# Patient Record
Sex: FEMALE | Race: OTHER | HISPANIC OR LATINO | URBAN - METROPOLITAN AREA
[De-identification: names, ages, dates, MRNs, and addresses within clinical notes are randomized per-mention and may not be internally consistent; named-entity substitution may affect disease eponyms.]

---

## 2019-06-05 ENCOUNTER — EMERGENCY (EMERGENCY)
Facility: HOSPITAL | Age: 54
LOS: 1 days | Discharge: HOME | End: 2019-06-05
Attending: EMERGENCY MEDICINE
Payer: MEDICAID

## 2019-06-05 VITALS
HEART RATE: 77 BPM | DIASTOLIC BLOOD PRESSURE: 77 MMHG | HEIGHT: 63 IN | RESPIRATION RATE: 20 BRPM | TEMPERATURE: 98 F | SYSTOLIC BLOOD PRESSURE: 126 MMHG | OXYGEN SATURATION: 100 % | WEIGHT: 190.04 LBS

## 2019-06-05 PROCEDURE — 71046 X-RAY EXAM CHEST 2 VIEWS: CPT

## 2019-06-05 PROCEDURE — 99284 EMERGENCY DEPT VISIT MOD MDM: CPT | Mod: 25

## 2019-06-05 PROCEDURE — 99283 EMERGENCY DEPT VISIT LOW MDM: CPT

## 2019-06-05 PROCEDURE — 71046 X-RAY EXAM CHEST 2 VIEWS: CPT | Mod: 26

## 2019-06-05 PROCEDURE — 93005 ELECTROCARDIOGRAM TRACING: CPT

## 2019-06-05 PROCEDURE — 96372 THER/PROPH/DIAG INJ SC/IM: CPT

## 2019-06-05 PROCEDURE — 93010 ELECTROCARDIOGRAM REPORT: CPT

## 2019-06-05 RX ORDER — FAMOTIDINE 10 MG/ML
20 INJECTION INTRAVENOUS ONCE
Refills: 0 | Status: DISCONTINUED | OUTPATIENT
Start: 2019-06-05 | End: 2019-06-05

## 2019-06-05 RX ORDER — IBUPROFEN 200 MG
1 TABLET ORAL
Qty: 9 | Refills: 0
Start: 2019-06-05 | End: 2019-06-07

## 2019-06-05 RX ORDER — VALACYCLOVIR 500 MG/1
1000 TABLET, FILM COATED ORAL ONCE
Refills: 0 | Status: COMPLETED | OUTPATIENT
Start: 2019-06-05 | End: 2019-06-05

## 2019-06-05 RX ORDER — OXYCODONE HYDROCHLORIDE 5 MG/1
1 TABLET ORAL
Qty: 9 | Refills: 0
Start: 2019-06-05 | End: 2019-06-07

## 2019-06-05 RX ORDER — VALACYCLOVIR 500 MG/1
1 TABLET, FILM COATED ORAL
Qty: 14 | Refills: 0
Start: 2019-06-05 | End: 2019-06-11

## 2019-06-05 RX ORDER — KETOROLAC TROMETHAMINE 30 MG/ML
30 SYRINGE (ML) INJECTION ONCE
Refills: 0 | Status: DISCONTINUED | OUTPATIENT
Start: 2019-06-05 | End: 2019-06-05

## 2019-06-05 RX ADMIN — Medication 30 MILLIGRAM(S): at 23:04

## 2019-06-05 RX ADMIN — VALACYCLOVIR 1000 MILLIGRAM(S): 500 TABLET, FILM COATED ORAL at 23:05

## 2019-06-05 NOTE — ED ADULT NURSE NOTE - OBJECTIVE STATEMENT
Pt is here with c/o upper, left  back pain that radiates to her L chest and L breast.  Pt noted to have a rash to her upper back, Dr Beal aware and order for IV insertion and labs canceled.  Pt denies difficulty breathing.

## 2019-06-05 NOTE — ED ADULT NURSE NOTE - NSIMPLEMENTINTERV_GEN_ALL_ED
Implemented All Universal Safety Interventions:  [place] to call system. Call bell, personal items and telephone within reach. Instruct patient to call for assistance. Room bathroom lighting operational. Non-slip footwear when patient is off stretcher. Physically safe environment: no spills, clutter or unnecessary equipment. Stretcher in lowest position, wheels locked, appropriate side rails in place.

## 2019-06-05 NOTE — ED STATDOCS - PROGRESS NOTE DETAILS
52 y/o F pt with hx of heart murmur, DM, anxiety and depression presents to the ED c/o constant L sided pectoral chest pain with assoc. L arm pain and back pain for 3 days. Pt reports she vomited today. Rates her pain a 9/10, states that her pain feels like "itching". Reports her grandfather has hx of cardiac etiology, but not her parents. Pt is currently on DM medication, depression meds, and anxiety meds. Denies taking ASA today. Denies abdominal pain. No further complaints at this   Cardio: None  PE: Reproducible L parascapular tenderness

## 2019-06-05 NOTE — ED PROVIDER NOTE - PROGRESS NOTE DETAILS
educated pt on shingles, advised her to quarantine and not drive. advised pt to stay away from children under 2 y/o, and adults older than 61 y/o.

## 2019-06-05 NOTE — ED PROVIDER NOTE - NS ED ROS FT
no weight change, no fever or chills  no recent travel, no recent abox, no sick contacts  no recent change in medications  no rash, no bruises, (+) itchiness  (+) ear pain  no visual changes no eye discharge  (+) cough, no cold or congestion,   no sob, (+) chest pain  no orthopnea, no pnd  no abd pain, no n/v/d  no hematuria, no change in urinary habits  no joint pain, no deformity  no headache, no paresthesia

## 2019-06-05 NOTE — ED PROVIDER NOTE - PHYSICAL EXAMINATION
Constitutional : Appears comfortably, talking in full sentences  Head :NC AT , no swelling  Eyes :eomi, no swelling  Mouth :mm moist,  Neck : supple, trachea in midline  Chest :[place] air entry, symm chest expansion, no distress  Heart :S1 S2 distant  Abdomen :abd soft, non tender  Musc/Skel :ext no swelling, no deformity, no spine tenderness, distal pulses present  Neuro  :AAO 3 no focal deficits  crop-like rash Constitutional : Appears comfortably, talking in full sentences  Head :NC AT , no swelling  Eyes :eomi, no swelling  Mouth :mm moist,  Neck : supple, trachea in midline  Chest :[place] air entry, symm chest expansion, no distress  Heart :S1 S2 distant  Abdomen :abd soft, non tender  Musc/Skel :ext no swelling, no deformity, no spine tenderness, distal pulses present  Neuro  :AAO 3 no focal deficits  Skin: crop-like rash to post chest wall, T4 and T5 area, with surrounding erythema. pt hypersensitive to touch to L T4 and L T5, radiating anterior to L breast, no rash noted to L breast.

## 2019-06-05 NOTE — ED ADULT NURSE NOTE - PRO INTERPRETER NEED 2
1038- 18g CIV LAC. Labs drawn and sent.  1041- Leon 0.4mg sl  1042- O2 3L PNC sats 89- 91%  1053- 2nd Nitro given.  O2 increased to 4L. Sats remain 89%  1058- D- Dimer added to labs.  1107- Pt advised of need to transfer for further evaluation. Capped IV wrapped with coban. Pt  states he will go to [facility] ER after stopping home to let out his dog. Informed of risks.Signed out AMA.   Valeriano Grimm speaking with Dr Danielson @ [facility].   English

## 2019-06-05 NOTE — ED PROVIDER NOTE - CLINICAL SUMMARY MEDICAL DECISION MAKING FREE TEXT BOX
52 y/o F with recent increased stress, , presents with L-sided chest wall pain, gradually increasing, constant, worse with leaning on rash, with residual occasional cough, plan to check CXR, r/o PNA, pain control, and reassess.

## 2019-06-08 PROBLEM — Z00.00 ENCOUNTER FOR PREVENTIVE HEALTH EXAMINATION: Status: ACTIVE | Noted: 2019-06-08

## 2019-10-22 NOTE — ED PROVIDER NOTE - OBJECTIVE STATEMENT
52 y/o F pt presents to the ED c/o CP beginning 3 days ago.  Pt reports pain to her L chest, radiating to her L breast and back.  She notes associated itchiness to her area of pain.  Pt also reports recent L ear pain and mild cough.  She notes recent increase in personal stress.  Pt states her doctor wants to evaluate pt for lupus.  Pt works as a .  Denies .  No further acute complaints at this time. Hx source: pt  54 y/o F pt presents to the ED c/o gradually worsening CP beginning 2 days ago.  Pt describes constant pain to her L chest, radiating to her L breast and back, gradually worsening in intensity.  She notes associated itchiness to her area of pain.  She notes worsening pain when leaning back.  Pt also reports recent L ear pain and occasional cough.  She notes recent increase in personal stress.  Pt states her doctor wants to evaluate pt for lupus.  Pt works as a .  Denies fever, chills, SOB.  No further acute complaints at this time. Patient will increase balance 1/2 grade in 1 week in order to increase balance at home

## 2019-11-13 ENCOUNTER — OUTPATIENT (OUTPATIENT)
Dept: OUTPATIENT SERVICES | Facility: HOSPITAL | Age: 54
LOS: 1 days | End: 2019-11-13
Payer: COMMERCIAL

## 2019-11-13 VITALS
WEIGHT: 175.93 LBS | SYSTOLIC BLOOD PRESSURE: 93 MMHG | HEIGHT: 63 IN | DIASTOLIC BLOOD PRESSURE: 58 MMHG | HEART RATE: 77 BPM | RESPIRATION RATE: 16 BRPM | TEMPERATURE: 78 F | OXYGEN SATURATION: 98 %

## 2019-11-13 DIAGNOSIS — Z98.890 OTHER SPECIFIED POSTPROCEDURAL STATES: Chronic | ICD-10-CM

## 2019-11-13 DIAGNOSIS — M25.511 PAIN IN RIGHT SHOULDER: ICD-10-CM

## 2019-11-13 DIAGNOSIS — M75.100 UNSPECIFIED ROTATOR CUFF TEAR OR RUPTURE OF UNSPECIFIED SHOULDER, NOT SPECIFIED AS TRAUMATIC: ICD-10-CM

## 2019-11-13 DIAGNOSIS — Z01.818 ENCOUNTER FOR OTHER PREPROCEDURAL EXAMINATION: ICD-10-CM

## 2019-11-13 LAB
ANION GAP SERPL CALC-SCNC: 6 MMOL/L — SIGNIFICANT CHANGE UP (ref 5–17)
BUN SERPL-MCNC: 12 MG/DL — SIGNIFICANT CHANGE UP (ref 7–23)
CALCIUM SERPL-MCNC: 9.5 MG/DL — SIGNIFICANT CHANGE UP (ref 8.4–10.5)
CHLORIDE SERPL-SCNC: 108 MMOL/L — SIGNIFICANT CHANGE UP (ref 96–108)
CO2 SERPL-SCNC: 29 MMOL/L — SIGNIFICANT CHANGE UP (ref 22–31)
CREAT SERPL-MCNC: 0.74 MG/DL — SIGNIFICANT CHANGE UP (ref 0.5–1.3)
GLUCOSE SERPL-MCNC: 101 MG/DL — HIGH (ref 70–99)
HBA1C BLD-MCNC: 7 % — HIGH (ref 4–5.6)
HCT VFR BLD CALC: 34.7 % — SIGNIFICANT CHANGE UP (ref 34.5–45)
HGB BLD-MCNC: 9.9 G/DL — LOW (ref 11.5–15.5)
MCHC RBC-ENTMCNC: 20.9 PG — LOW (ref 27–34)
MCHC RBC-ENTMCNC: 28.5 GM/DL — LOW (ref 32–36)
MCV RBC AUTO: 73.2 FL — LOW (ref 80–100)
NRBC # BLD: 0 /100 WBCS — SIGNIFICANT CHANGE UP (ref 0–0)
PLATELET # BLD AUTO: 344 K/UL — SIGNIFICANT CHANGE UP (ref 150–400)
POTASSIUM SERPL-MCNC: 4.2 MMOL/L — SIGNIFICANT CHANGE UP (ref 3.5–5.3)
POTASSIUM SERPL-SCNC: 4.2 MMOL/L — SIGNIFICANT CHANGE UP (ref 3.5–5.3)
RBC # BLD: 4.74 M/UL — SIGNIFICANT CHANGE UP (ref 3.8–5.2)
RBC # FLD: 18.8 % — HIGH (ref 10.3–14.5)
SODIUM SERPL-SCNC: 143 MMOL/L — SIGNIFICANT CHANGE UP (ref 135–145)
WBC # BLD: 6.82 K/UL — SIGNIFICANT CHANGE UP (ref 3.8–10.5)
WBC # FLD AUTO: 6.82 K/UL — SIGNIFICANT CHANGE UP (ref 3.8–10.5)

## 2019-11-13 PROCEDURE — 93010 ELECTROCARDIOGRAM REPORT: CPT

## 2019-11-13 PROCEDURE — 85027 COMPLETE CBC AUTOMATED: CPT

## 2019-11-13 PROCEDURE — 83036 HEMOGLOBIN GLYCOSYLATED A1C: CPT

## 2019-11-13 PROCEDURE — 36415 COLL VENOUS BLD VENIPUNCTURE: CPT

## 2019-11-13 PROCEDURE — 93005 ELECTROCARDIOGRAM TRACING: CPT

## 2019-11-13 PROCEDURE — G0463: CPT

## 2019-11-13 PROCEDURE — 80048 BASIC METABOLIC PNL TOTAL CA: CPT

## 2019-11-13 NOTE — H&P PST ADULT - NSICDXPASTSURGICALHX_GEN_ALL_CORE_FT
PAST SURGICAL HISTORY:  History of cosmetic plastic surgery tummy tuck, breast lifting    S/P arthroscopy of right shoulder

## 2019-11-13 NOTE — H&P PST ADULT - NSANTHOSAYNRD_GEN_A_CORE
No. EVELIN screening performed.  STOP BANG Legend: 0-2 = LOW Risk; 3-4 = INTERMEDIATE Risk; 5-8 = HIGH Risk
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
## 2019-11-13 NOTE — H&P PST ADULT - NSICDXPASTMEDICALHX_GEN_ALL_CORE_FT
PAST MEDICAL HISTORY:  Anxiety and depression     Bipolar disorder     Hypothyroid     Type 2 diabetes mellitus

## 2019-11-13 NOTE — H&P PST ADULT - RS GEN PE MLT RESP DETAILS PC
respirations non-labored/breath sounds equal/clear to auscultation bilaterally/good air movement/normal/airway patent

## 2019-11-13 NOTE — H&P PST ADULT - NSICDXPROBLEM_GEN_ALL_CORE_FT
PROBLEM DIAGNOSES  Problem: Right shoulder pain  Assessment and Plan: right shoulder arthroscopy on 11/26/19  medical clearance

## 2019-11-18 PROBLEM — E11.9 TYPE 2 DIABETES MELLITUS WITHOUT COMPLICATIONS: Chronic | Status: ACTIVE | Noted: 2019-11-13

## 2019-11-18 PROBLEM — F31.9 BIPOLAR DISORDER, UNSPECIFIED: Chronic | Status: ACTIVE | Noted: 2019-11-13

## 2019-11-18 PROBLEM — E03.9 HYPOTHYROIDISM, UNSPECIFIED: Chronic | Status: ACTIVE | Noted: 2019-11-13

## 2019-11-18 PROBLEM — F41.9 ANXIETY DISORDER, UNSPECIFIED: Chronic | Status: ACTIVE | Noted: 2019-11-13

## 2019-11-19 ENCOUNTER — OTHER (OUTPATIENT)
Age: 54
End: 2019-11-19

## 2019-11-19 ENCOUNTER — APPOINTMENT (OUTPATIENT)
Dept: GASTROENTEROLOGY | Facility: CLINIC | Age: 54
End: 2019-11-19
Payer: MEDICAID

## 2019-11-19 VITALS
SYSTOLIC BLOOD PRESSURE: 110 MMHG | WEIGHT: 172 LBS | RESPIRATION RATE: 14 BRPM | BODY MASS INDEX: 30.48 KG/M2 | OXYGEN SATURATION: 99 % | DIASTOLIC BLOOD PRESSURE: 70 MMHG | HEIGHT: 63 IN | HEART RATE: 68 BPM

## 2019-11-19 DIAGNOSIS — Z86.39 PERSONAL HISTORY OF OTHER ENDOCRINE, NUTRITIONAL AND METABOLIC DISEASE: ICD-10-CM

## 2019-11-19 DIAGNOSIS — Z78.9 OTHER SPECIFIED HEALTH STATUS: ICD-10-CM

## 2019-11-19 DIAGNOSIS — R11.2 NAUSEA WITH VOMITING, UNSPECIFIED: ICD-10-CM

## 2019-11-19 DIAGNOSIS — Z86.59 PERSONAL HISTORY OF OTHER MENTAL AND BEHAVIORAL DISORDERS: ICD-10-CM

## 2019-11-19 DIAGNOSIS — K21.9 GASTRO-ESOPHAGEAL REFLUX DISEASE W/OUT ESOPHAGITIS: ICD-10-CM

## 2019-11-19 DIAGNOSIS — R10.13 EPIGASTRIC PAIN: ICD-10-CM

## 2019-11-19 DIAGNOSIS — Z12.11 ENCOUNTER FOR SCREENING FOR MALIGNANT NEOPLASM OF COLON: ICD-10-CM

## 2019-11-19 PROCEDURE — 99204 OFFICE O/P NEW MOD 45 MIN: CPT

## 2019-11-19 RX ORDER — LIRAGLUTIDE 6 MG/ML
INJECTION SUBCUTANEOUS
Refills: 0 | Status: ACTIVE | COMMUNITY

## 2019-11-19 RX ORDER — LEVOTHYROXINE SODIUM 0.17 MG/1
TABLET ORAL
Refills: 0 | Status: ACTIVE | COMMUNITY

## 2019-11-19 RX ORDER — ALPRAZOLAM 2 MG/1
TABLET ORAL
Refills: 0 | Status: ACTIVE | COMMUNITY

## 2019-11-19 RX ORDER — METFORMIN HYDROCHLORIDE 625 MG/1
TABLET ORAL
Refills: 0 | Status: ACTIVE | COMMUNITY

## 2019-11-19 RX ORDER — LURASIDONE HYDROCHLORIDE 60 MG/1
TABLET, FILM COATED ORAL
Refills: 0 | Status: ACTIVE | COMMUNITY

## 2019-11-19 NOTE — REASON FOR VISIT
[Initial Evaluation] : an initial evaluation [FreeTextEntry1] : GERD, nausea, vomiting, constipation

## 2019-11-19 NOTE — HISTORY OF PRESENT ILLNESS
[Nausea] : nausea worsened [Heartburn] : heartburn worsened [Vomiting] : vomiting worsened [Diarrhea] : denies diarrhea [Constipation] : constipation worsened [Yellow Skin Or Eyes (Jaundice)] : denies jaundice [Rectal Pain] : denies rectal pain [Abdominal Pain] : denies abdominal pain [Abdominal Swelling] : denies abdominal swelling [de-identified] : ABDULKADIR PURVIS is a 54 year old female presenting today with complaints of worsening acid reflux, nausea and vomiting, and constipation. She reports that she has suffered from acid reflux and constipation her entire life. She will get epigastric discomfort and acid reflux several times a day and nausea and vomiting 1-2 times a week. She currently takes Omeprazole 20 mg with no relief. She reportedly had an EGD with Dr. Mcgregor about 3 years ago but the records are not available for review. She has chronic constipation. She will move her bowels once a week or less. She has taken Dulcolax in the past without relief. She had a colonoscopy "a few years ago at [facility]" but does not recall the doctor who performed it and there are no records for review. She believes it was a different provider than Dr. Mcgregor. She has no family history of colon cancer or polyps. \par Her medical history includes type 2 diabetes, hypothyroid, bipolar disorder. Her BMI is 30.47.

## 2019-11-19 NOTE — ASSESSMENT
[FreeTextEntry1] : The patient presents today with worsening acid reflux and constipation. She will be scheduled for an upper endoscopy for further evaluation. I have discussed the indications (including but not limited to ruling out Moore's esophagus, AVM's, H. pylori, and malignancies), benefits, risks (including but not limited to reaction to the anesthesia, infection, bleeding, and perforation), and alternatives to an endoscopy. The patient understands all options and has agreed to endoscopy and is medically optimized for the planned procedure. \par \par It is likely that her worsening reflux is due to her chronic constipation or possibly even gastroparesis due to diabetes. She will undergo a gastric emptying study to rule out gastroparesis. She will also start taking Benefiber 1 tablespoon in 8 oz of fluid in the morning and MiraLAX 1 capful in 8 oz of fluid in the evening to help regulate her bowel movements. \par \par We will request her recent labs from her PCP Dr. Lee for review. She has a history of hypothyroid and if her levels are out of range that may also be a contributing factor to her constipation.\par \par We will also obtain her previous records from Dr. Mcgregor's office.

## 2019-11-19 NOTE — PHYSICAL EXAM
[General Appearance - Alert] : alert [General Appearance - In No Acute Distress] : in no acute distress [Sclera] : the sclera and conjunctiva were normal [Extraocular Movements] : extraocular movements were intact [Outer Ear] : the ears and nose were normal in appearance [PERRL With Normal Accommodation] : pupils were equal in size, round, and reactive to light [Oropharynx] : the oropharynx was normal [Neck Appearance] : the appearance of the neck was normal [Neck Cervical Mass (___cm)] : no neck mass was observed [Thyroid Diffuse Enlargement] : the thyroid was not enlarged [Thyroid Nodule] : there were no palpable thyroid nodules [Jugular Venous Distention Increased] : there was no jugular-venous distention [Auscultation Breath Sounds / Voice Sounds] : lungs were clear to auscultation bilaterally [Heart Rate And Rhythm] : heart rate was normal and rhythm regular [Heart Sounds] : normal S1 and S2 [Murmurs] : no murmurs [Heart Sounds Gallop] : no gallops [Heart Sounds Pericardial Friction Rub] : no pericardial rub [Bowel Sounds] : normal bowel sounds [Abdomen Tenderness] : non-tender [Abdomen Soft] : soft [Abdomen Mass (___ Cm)] : no abdominal mass palpated [Abnormal Walk] : normal gait [Musculoskeletal - Swelling] : no joint swelling seen [Nail Clubbing] : no clubbing  or cyanosis of the fingernails [Skin Color & Pigmentation] : normal skin color and pigmentation [Motor Tone] : muscle strength and tone were normal [Deep Tendon Reflexes (DTR)] : deep tendon reflexes were 2+ and symmetric [] : no rash [Skin Turgor] : normal skin turgor [Sensation] : the sensory exam was normal to light touch and pinprick [Oriented To Time, Place, And Person] : oriented to person, place, and time [No Focal Deficits] : no focal deficits [Impaired Insight] : insight and judgment were intact [Affect] : the affect was normal

## 2019-11-19 NOTE — ADDENDUM
[FreeTextEntry1] : I have personally seen and examined the patient. I agree with the history, physical examination, assessment and recommendations as noted above.\par \par Casey Alvarez MD\par Gastroenterology\par

## 2019-11-25 ENCOUNTER — TRANSCRIPTION ENCOUNTER (OUTPATIENT)
Age: 54
End: 2019-11-25

## 2019-11-26 ENCOUNTER — RESULT REVIEW (OUTPATIENT)
Age: 54
End: 2019-11-26

## 2019-11-26 ENCOUNTER — OUTPATIENT (OUTPATIENT)
Dept: OUTPATIENT SERVICES | Facility: HOSPITAL | Age: 54
LOS: 1 days | End: 2019-11-26
Payer: COMMERCIAL

## 2019-11-26 VITALS
TEMPERATURE: 98 F | DIASTOLIC BLOOD PRESSURE: 74 MMHG | HEART RATE: 77 BPM | RESPIRATION RATE: 16 BRPM | HEIGHT: 63 IN | WEIGHT: 171.52 LBS | OXYGEN SATURATION: 99 % | SYSTOLIC BLOOD PRESSURE: 116 MMHG

## 2019-11-26 VITALS
RESPIRATION RATE: 15 BRPM | OXYGEN SATURATION: 95 % | HEART RATE: 77 BPM | SYSTOLIC BLOOD PRESSURE: 108 MMHG | DIASTOLIC BLOOD PRESSURE: 69 MMHG

## 2019-11-26 DIAGNOSIS — M75.100 UNSPECIFIED ROTATOR CUFF TEAR OR RUPTURE OF UNSPECIFIED SHOULDER, NOT SPECIFIED AS TRAUMATIC: ICD-10-CM

## 2019-11-26 DIAGNOSIS — Z98.890 OTHER SPECIFIED POSTPROCEDURAL STATES: Chronic | ICD-10-CM

## 2019-11-26 LAB
GLUCOSE BLDC GLUCOMTR-MCNC: 110 MG/DL — HIGH (ref 70–99)
HCG UR QL: NEGATIVE — SIGNIFICANT CHANGE UP

## 2019-11-26 PROCEDURE — 88304 TISSUE EXAM BY PATHOLOGIST: CPT

## 2019-11-26 PROCEDURE — C1713: CPT

## 2019-11-26 PROCEDURE — 81025 URINE PREGNANCY TEST: CPT

## 2019-11-26 PROCEDURE — 82962 GLUCOSE BLOOD TEST: CPT

## 2019-11-26 PROCEDURE — 88304 TISSUE EXAM BY PATHOLOGIST: CPT | Mod: 26

## 2019-11-26 PROCEDURE — 29827 SHO ARTHRS SRG RT8TR CUF RPR: CPT | Mod: RT

## 2019-11-26 RX ORDER — CHLORHEXIDINE GLUCONATE 213 G/1000ML
1 SOLUTION TOPICAL ONCE
Refills: 0 | Status: COMPLETED | OUTPATIENT
Start: 2019-11-26 | End: 2019-11-26

## 2019-11-26 RX ORDER — ONDANSETRON 8 MG/1
4 TABLET, FILM COATED ORAL ONCE
Refills: 0 | Status: COMPLETED | OUTPATIENT
Start: 2019-11-26 | End: 2019-11-26

## 2019-11-26 RX ORDER — SODIUM CHLORIDE 9 MG/ML
1000 INJECTION, SOLUTION INTRAVENOUS
Refills: 0 | Status: DISCONTINUED | OUTPATIENT
Start: 2019-11-26 | End: 2019-11-27

## 2019-11-26 RX ORDER — LIRAGLUTIDE 6 MG/ML
0 INJECTION SUBCUTANEOUS
Qty: 0 | Refills: 0 | DISCHARGE

## 2019-11-26 RX ORDER — FLUOXETINE HCL 10 MG
1 CAPSULE ORAL
Qty: 0 | Refills: 0 | DISCHARGE

## 2019-11-26 RX ORDER — FLUOXETINE HCL 10 MG
0 CAPSULE ORAL
Qty: 90 | Refills: 0 | DISCHARGE

## 2019-11-26 RX ORDER — LEVOTHYROXINE SODIUM 125 MCG
1 TABLET ORAL
Qty: 0 | Refills: 0 | DISCHARGE

## 2019-11-26 RX ORDER — HYDROMORPHONE HYDROCHLORIDE 2 MG/ML
0.5 INJECTION INTRAMUSCULAR; INTRAVENOUS; SUBCUTANEOUS
Refills: 0 | Status: DISCONTINUED | OUTPATIENT
Start: 2019-11-26 | End: 2019-11-27

## 2019-11-26 RX ORDER — FAMOTIDINE 10 MG/ML
1 INJECTION INTRAVENOUS
Qty: 0 | Refills: 0 | DISCHARGE

## 2019-11-26 RX ORDER — CEFAZOLIN SODIUM 1 G
2000 VIAL (EA) INJECTION ONCE
Refills: 0 | Status: COMPLETED | OUTPATIENT
Start: 2019-11-26 | End: 2019-11-26

## 2019-11-26 RX ORDER — ALPRAZOLAM 0.25 MG
1 TABLET ORAL
Qty: 0 | Refills: 0 | DISCHARGE

## 2019-11-26 RX ORDER — LURASIDONE HYDROCHLORIDE 40 MG/1
1 TABLET ORAL
Qty: 0 | Refills: 0 | DISCHARGE

## 2019-11-26 RX ORDER — METFORMIN HYDROCHLORIDE 850 MG/1
1 TABLET ORAL
Qty: 0 | Refills: 0 | DISCHARGE

## 2019-11-26 RX ADMIN — ONDANSETRON 4 MILLIGRAM(S): 8 TABLET, FILM COATED ORAL at 15:37

## 2019-11-26 RX ADMIN — SODIUM CHLORIDE 50 MILLILITER(S): 9 INJECTION, SOLUTION INTRAVENOUS at 15:38

## 2019-11-26 RX ADMIN — CHLORHEXIDINE GLUCONATE 1 APPLICATION(S): 213 SOLUTION TOPICAL at 10:36

## 2019-11-26 NOTE — ASU DISCHARGE PLAN (ADULT/PEDIATRIC) - ASU DC SPECIAL INSTRUCTIONSFT
rest  ice   sling   wiggle wrist finger and hand  NON-weight bearing right upper extremity   follow up primary care MD   follow up Dr. Farah office call MD for appt

## 2019-11-26 NOTE — ASU DISCHARGE PLAN (ADULT/PEDIATRIC) - PATIENT BELONGINGS
Detail Level: Detailed Quality 226: Preventive Care And Screening: Tobacco Use: Screening And Cessation Intervention: Patient screened for tobacco use and is an ex/non-smoker Quality 130: Documentation Of Current Medications In The Medical Record: Current Medications Documented Quality 110: Preventive Care And Screening: Influenza Immunization: Influenza Immunization previously received during influenza season Patient's belongings returned

## 2019-11-26 NOTE — ASU DISCHARGE PLAN (ADULT/PEDIATRIC) - CARE PROVIDER_API CALL
Bryan Farah)  Orthopaedic Surgery  [address] [place] [address], [address]  [place] [address]  Phone: [phone]  Fax: [phone]  Follow Up Time:

## 2019-11-26 NOTE — ASU DISCHARGE PLAN (ADULT/PEDIATRIC) - CALL YOUR DOCTOR IF YOU HAVE ANY OF THE FOLLOWING:
Nausea and vomiting that does not stop/Fever greater than (need to indicate Fahrenheit or Celsius)/Excessive diarrhea/Wound/Surgical Site with redness, or foul smelling discharge or pus/Bleeding that does not stop/Swelling that gets worse

## 2019-11-27 ENCOUNTER — EMERGENCY (EMERGENCY)
Facility: HOSPITAL | Age: 54
LOS: 1 days | Discharge: HOME | End: 2019-11-27
Attending: EMERGENCY MEDICINE
Payer: COMMERCIAL

## 2019-11-27 VITALS
TEMPERATURE: 98 F | RESPIRATION RATE: 18 BRPM | WEIGHT: 177.91 LBS | HEART RATE: 94 BPM | OXYGEN SATURATION: 97 % | HEIGHT: 63 IN | SYSTOLIC BLOOD PRESSURE: 110 MMHG | DIASTOLIC BLOOD PRESSURE: 74 MMHG

## 2019-11-27 DIAGNOSIS — Z98.890 OTHER SPECIFIED POSTPROCEDURAL STATES: Chronic | ICD-10-CM

## 2019-11-27 LAB
ANION GAP SERPL CALC-SCNC: 12 MMOL/L — SIGNIFICANT CHANGE UP (ref 5–17)
ANISOCYTOSIS BLD QL: SLIGHT — SIGNIFICANT CHANGE UP
APTT BLD: 29.4 SEC — SIGNIFICANT CHANGE UP (ref 27.5–36.3)
BASOPHILS # BLD AUTO: 0.01 K/UL — SIGNIFICANT CHANGE UP (ref 0–0.2)
BASOPHILS NFR BLD AUTO: 0.1 % — SIGNIFICANT CHANGE UP (ref 0–2)
BUN SERPL-MCNC: 15 MG/DL — SIGNIFICANT CHANGE UP (ref 8–20)
CALCIUM SERPL-MCNC: 9.1 MG/DL — SIGNIFICANT CHANGE UP (ref 8.6–10.2)
CHLORIDE SERPL-SCNC: 106 MMOL/L — SIGNIFICANT CHANGE UP (ref 98–107)
CO2 SERPL-SCNC: 22 MMOL/L — SIGNIFICANT CHANGE UP (ref 22–29)
CREAT SERPL-MCNC: 0.89 MG/DL — SIGNIFICANT CHANGE UP (ref 0.5–1.3)
DACRYOCYTES BLD QL SMEAR: SLIGHT — SIGNIFICANT CHANGE UP
ELLIPTOCYTES BLD QL SMEAR: SLIGHT — SIGNIFICANT CHANGE UP
EOSINOPHIL # BLD AUTO: 0 K/UL — SIGNIFICANT CHANGE UP (ref 0–0.5)
EOSINOPHIL NFR BLD AUTO: 0 % — SIGNIFICANT CHANGE UP (ref 0–6)
GLUCOSE SERPL-MCNC: 286 MG/DL — HIGH (ref 70–115)
HCT VFR BLD CALC: 32.8 % — LOW (ref 34.5–45)
HGB BLD-MCNC: 9.3 G/DL — LOW (ref 11.5–15.5)
HYPOCHROMIA BLD QL: SLIGHT — SIGNIFICANT CHANGE UP
IMM GRANULOCYTES NFR BLD AUTO: 0.6 % — SIGNIFICANT CHANGE UP (ref 0–1.5)
INR BLD: 1.07 RATIO — SIGNIFICANT CHANGE UP (ref 0.88–1.16)
LYMPHOCYTES # BLD AUTO: 0.97 K/UL — LOW (ref 1–3.3)
LYMPHOCYTES # BLD AUTO: 8.6 % — LOW (ref 13–44)
MANUAL SMEAR VERIFICATION: SIGNIFICANT CHANGE UP
MCHC RBC-ENTMCNC: 20.8 PG — LOW (ref 27–34)
MCHC RBC-ENTMCNC: 28.4 GM/DL — LOW (ref 32–36)
MCV RBC AUTO: 73.2 FL — LOW (ref 80–100)
MICROCYTES BLD QL: SLIGHT — SIGNIFICANT CHANGE UP
MONOCYTES # BLD AUTO: 0.66 K/UL — SIGNIFICANT CHANGE UP (ref 0–0.9)
MONOCYTES NFR BLD AUTO: 5.8 % — SIGNIFICANT CHANGE UP (ref 2–14)
NEUTROPHILS # BLD AUTO: 9.58 K/UL — HIGH (ref 1.8–7.4)
NEUTROPHILS NFR BLD AUTO: 84.9 % — HIGH (ref 43–77)
OVALOCYTES BLD QL SMEAR: SIGNIFICANT CHANGE UP
PLAT MORPH BLD: NORMAL — SIGNIFICANT CHANGE UP
PLATELET # BLD AUTO: 421 K/UL — HIGH (ref 150–400)
POIKILOCYTOSIS BLD QL AUTO: SIGNIFICANT CHANGE UP
POLYCHROMASIA BLD QL SMEAR: SLIGHT — SIGNIFICANT CHANGE UP
POTASSIUM SERPL-MCNC: 4.4 MMOL/L — SIGNIFICANT CHANGE UP (ref 3.5–5.3)
POTASSIUM SERPL-SCNC: 4.4 MMOL/L — SIGNIFICANT CHANGE UP (ref 3.5–5.3)
PROTHROM AB SERPL-ACNC: 12.3 SEC — SIGNIFICANT CHANGE UP (ref 10–12.9)
RBC # BLD: 4.48 M/UL — SIGNIFICANT CHANGE UP (ref 3.8–5.2)
RBC # FLD: 19.2 % — HIGH (ref 10.3–14.5)
RBC BLD AUTO: ABNORMAL
SCHISTOCYTES BLD QL AUTO: SLIGHT — SIGNIFICANT CHANGE UP
SODIUM SERPL-SCNC: 140 MMOL/L — SIGNIFICANT CHANGE UP (ref 135–145)
TROPONIN T SERPL-MCNC: <0.01 NG/ML — SIGNIFICANT CHANGE UP (ref 0–0.06)
WBC # BLD: 11.29 K/UL — HIGH (ref 3.8–10.5)
WBC # FLD AUTO: 11.29 K/UL — HIGH (ref 3.8–10.5)

## 2019-11-27 PROCEDURE — 99283 EMERGENCY DEPT VISIT LOW MDM: CPT | Mod: 25

## 2019-11-27 PROCEDURE — 93010 ELECTROCARDIOGRAM REPORT: CPT

## 2019-11-27 PROCEDURE — 71045 X-RAY EXAM CHEST 1 VIEW: CPT

## 2019-11-27 PROCEDURE — 85610 PROTHROMBIN TIME: CPT

## 2019-11-27 PROCEDURE — 84484 ASSAY OF TROPONIN QUANT: CPT

## 2019-11-27 PROCEDURE — 85730 THROMBOPLASTIN TIME PARTIAL: CPT

## 2019-11-27 PROCEDURE — 85027 COMPLETE CBC AUTOMATED: CPT

## 2019-11-27 PROCEDURE — 71045 X-RAY EXAM CHEST 1 VIEW: CPT | Mod: 26

## 2019-11-27 PROCEDURE — 99285 EMERGENCY DEPT VISIT HI MDM: CPT

## 2019-11-27 PROCEDURE — 80048 BASIC METABOLIC PNL TOTAL CA: CPT

## 2019-11-27 PROCEDURE — 93005 ELECTROCARDIOGRAM TRACING: CPT

## 2019-11-27 PROCEDURE — 36415 COLL VENOUS BLD VENIPUNCTURE: CPT

## 2019-11-27 PROCEDURE — 85379 FIBRIN DEGRADATION QUANT: CPT

## 2019-11-27 PROCEDURE — 99053 MED SERV 10PM-8AM 24 HR FAC: CPT

## 2019-11-27 RX ORDER — LIDOCAINE 4 G/100G
10 CREAM TOPICAL ONCE
Refills: 0 | Status: COMPLETED | OUTPATIENT
Start: 2019-11-27 | End: 2019-11-27

## 2019-11-27 RX ORDER — FAMOTIDINE 10 MG/ML
20 INJECTION INTRAVENOUS ONCE
Refills: 0 | Status: COMPLETED | OUTPATIENT
Start: 2019-11-27 | End: 2019-11-27

## 2019-11-27 RX ADMIN — FAMOTIDINE 20 MILLIGRAM(S): 10 INJECTION INTRAVENOUS at 06:48

## 2019-11-27 NOTE — ED ADULT NURSE NOTE - OBJECTIVE STATEMENT
PT presents to ED for chest pain SOB and acid reflux like symptoms that started tonight around 7pm. PT had right shoulder arthroscopy yesterday at [facility]. PT in right shoulder immobilizer. Pt states today she used her incentive spirometer. No feels chest heaviness. Took 800mg Motrin at 0200 with no relief of symptoms. Pain 8/10

## 2019-11-27 NOTE — ED PROVIDER NOTE - OBJECTIVE STATEMENT
pt presents to Ed c/o substernal cp achy non radiating since 7 pm last night and achy right shoulder pain POD 1 from right shoulder arthoplasty . denies fever. denies HA or neck pain. + chest pain or sob. no abd pain. no n/v/d. no urinary f/u/d. no back pain. no motor or sensory deficits. denies illicit drug use. no recent travel. no rash. no other acute issues symptoms or concerns

## 2019-11-27 NOTE — ED PROVIDER NOTE - PATIENT PORTAL LINK FT
You can access the FollowMyHealth Patient Portal offered by [facility] by registering at the following website: [email or web address]. By joining [employer]’s FollowMyHealth portal, you will also be able to view your health information using other applications (apps) compatible with our system.

## 2019-11-27 NOTE — ED ADULT TRIAGE NOTE - CHIEF COMPLAINT QUOTE
Pt. complaining of mid sternal chest pressure, acid reflux and sob that began approximately 1900.  Pt. also complaining of right shoulder pain s/p right shoulder arthroscopy yesterday; right arm in brace on arrival.   Pt. states chest pressure is non radiating.  Pt. with history of DM.  Pt. took motrin at 2am

## 2019-11-27 NOTE — ED PROVIDER NOTE - CLINICAL SUMMARY MEDICAL DECISION MAKING FREE TEXT BOX
low risk per welss criteria do not suspect deep space infection right choulder DDX considered: stable angina, aortic dissection, pericarditis, pneumonia, pe, pts, chest wall pain, esophageal spasm and abdominal emergencies. I have discussed with patient with negative troponin and normal ekg their 28 day cardiac risk and they have agreed to outpt cardio f/ut his week without fail. asa per day until then. return to the ed immediately for any intractable chest pain or sob. pt agrees to plan of care

## 2020-02-18 ENCOUNTER — APPOINTMENT (OUTPATIENT)
Dept: NEUROLOGY | Facility: CLINIC | Age: 55
End: 2020-02-18
Payer: MEDICAID

## 2020-02-18 VITALS
BODY MASS INDEX: 31.01 KG/M2 | SYSTOLIC BLOOD PRESSURE: 140 MMHG | WEIGHT: 175 LBS | DIASTOLIC BLOOD PRESSURE: 100 MMHG | HEIGHT: 63 IN

## 2020-02-18 DIAGNOSIS — G31.84 MILD COGNITIVE IMPAIRMENT, SO STATED: ICD-10-CM

## 2020-02-18 DIAGNOSIS — R41.3 OTHER AMNESIA: ICD-10-CM

## 2020-02-18 DIAGNOSIS — Z86.39 PERSONAL HISTORY OF OTHER ENDOCRINE, NUTRITIONAL AND METABOLIC DISEASE: ICD-10-CM

## 2020-02-18 PROCEDURE — 99204 OFFICE O/P NEW MOD 45 MIN: CPT

## 2020-02-18 RX ORDER — IBUPROFEN 800 MG/1
800 TABLET, FILM COATED ORAL
Refills: 0 | Status: ACTIVE | COMMUNITY

## 2020-02-18 RX ORDER — OXYCODONE HYDROCHLORIDE 30 MG/1
TABLET ORAL
Refills: 0 | Status: ACTIVE | COMMUNITY

## 2020-02-18 RX ORDER — FAMOTIDINE 40 MG/1
TABLET, FILM COATED ORAL
Refills: 0 | Status: ACTIVE | COMMUNITY

## 2020-02-18 RX ORDER — FLUOXETINE HCL 10 MG
TABLET ORAL
Refills: 0 | Status: ACTIVE | COMMUNITY

## 2020-02-21 ENCOUNTER — APPOINTMENT (OUTPATIENT)
Dept: NEUROLOGY | Facility: CLINIC | Age: 55
End: 2020-02-21

## 2020-02-25 ENCOUNTER — APPOINTMENT (OUTPATIENT)
Dept: NEUROLOGY | Facility: CLINIC | Age: 55
End: 2020-02-25
Payer: MEDICAID

## 2020-02-25 PROCEDURE — 95819 EEG AWAKE AND ASLEEP: CPT

## 2020-02-25 PROCEDURE — 93040 RHYTHM ECG WITH REPORT: CPT

## 2020-03-18 ENCOUNTER — APPOINTMENT (OUTPATIENT)
Dept: NEUROLOGY | Facility: CLINIC | Age: 55
End: 2020-03-18

## 2020-06-19 ENCOUNTER — EMERGENCY (EMERGENCY)
Facility: HOSPITAL | Age: 55
LOS: 1 days | Discharge: HOME | End: 2020-06-19
Attending: EMERGENCY MEDICINE
Payer: MEDICAID

## 2020-06-19 VITALS
SYSTOLIC BLOOD PRESSURE: 144 MMHG | OXYGEN SATURATION: 98 % | RESPIRATION RATE: 18 BRPM | WEIGHT: 190.04 LBS | HEIGHT: 63 IN | TEMPERATURE: 98 F | DIASTOLIC BLOOD PRESSURE: 69 MMHG | HEART RATE: 70 BPM

## 2020-06-19 VITALS — TEMPERATURE: 97 F

## 2020-06-19 DIAGNOSIS — Z98.890 OTHER SPECIFIED POSTPROCEDURAL STATES: Chronic | ICD-10-CM

## 2020-06-19 PROCEDURE — 99284 EMERGENCY DEPT VISIT MOD MDM: CPT

## 2020-06-19 RX ORDER — FAMOTIDINE 10 MG/ML
1 INJECTION INTRAVENOUS
Qty: 16 | Refills: 0
Start: 2020-06-19 | End: 2020-06-22

## 2020-06-19 RX ORDER — FAMOTIDINE 10 MG/ML
20 INJECTION INTRAVENOUS ONCE
Refills: 0 | Status: COMPLETED | OUTPATIENT
Start: 2020-06-19 | End: 2020-06-19

## 2020-06-19 RX ORDER — DIPHENHYDRAMINE HCL 50 MG
50 CAPSULE ORAL ONCE
Refills: 0 | Status: COMPLETED | OUTPATIENT
Start: 2020-06-19 | End: 2020-06-19

## 2020-06-19 RX ORDER — DIPHENHYDRAMINE HCL 50 MG
1 CAPSULE ORAL
Qty: 12 | Refills: 0
Start: 2020-06-19 | End: 2020-06-22

## 2020-06-19 RX ADMIN — FAMOTIDINE 20 MILLIGRAM(S): 10 INJECTION INTRAVENOUS at 08:55

## 2020-06-19 RX ADMIN — Medication 50 MILLIGRAM(S): at 08:56

## 2020-06-19 RX ADMIN — Medication 50 MILLIGRAM(S): at 08:55

## 2020-06-19 NOTE — ED ADULT TRIAGE NOTE - CHIEF COMPLAINT QUOTE
"I felt burning on my neck and chest and arms" "I ate chicken cutlet this morning and then it started" c/o burning rash to chest neck and arms. Denies difficulty swallowing denies known allergies denies itching. Able to speak clearly to RN, tolerating secretions.

## 2020-06-19 NOTE — ED STATDOCS - ATTENDING CONTRIBUTION TO CARE
Jessica: I performed a face to face bedside interview with patient regarding history of present illness, review of symptoms and past medical history. I completed an independent physical exam and ordered tests/medications as needed.  I have discussed patient's plan of care with advanced care provider. The advanced care provider assisted in  executing the discussed plan. I was available for any questions or issues that may have arose during the execution of the plan of care.

## 2020-06-19 NOTE — ED STATDOCS - CARE PROVIDER_API CALL
Hang Watkins [address] [place] [address]  Phone: [phone]  Fax: [phone]  Follow Up Time:

## 2020-06-19 NOTE — ED STATDOCS - NSFOLLOWUPINSTRUCTIONS_ED_ALL_ED_FT
-TAKE MEDICATION AS PRESCRIBED  -FOLLOW UP WITH ALLERGIST       Allergic Reaction    An allergic reaction is an abnormal reaction to a substance (allergen) by the body's defense system. Common allergens include medicines, food, insect bites or stings, and blood products. The body releases certain proteins into the blood that can cause a variety of symptoms such as an itchy rash, wheezing, swelling of the face/lips/tongue/throat, abdominal pain, nausea or vomiting. An allergic reaction is usually treated with medication. If your health care provider prescribed you an epinephrine injection device, make sure to keep it with you at all times.    SEEK IMMEDIATE MEDICAL CARE IF YOU HAVE ANY OF THE FOLLOWING SYMPTOMS: allergic reaction severe enough that required you to use epinephrine, tightness in your chest, swelling around your lips/tongue/throat, abdominal pain, vomiting or diarrhea, or lightheadedness/dizziness. These symptoms may represent a serious problem that is an emergency. Do not wait to see if the symptoms will go away. Use your auto-injector pen or anaphylaxis kit as you have been instructed. Call 911 and do not drive yourself to the hospital.

## 2020-06-19 NOTE — ED STATDOCS - PROGRESS NOTE DETAILS
54 year old female p/w itchy rash after eating chicken. Given benadryl, pepcid and prednisone. Rash has improved. Airway patent, Lungs CTA. Will d/c and advise pt to follow up with allergist.

## 2020-06-19 NOTE — ED STATDOCS - OBJECTIVE STATEMENT
55 y/o F pt presents to ED c/o itchy/burning rash to neck, chest, face and left arm x 1 hour. Pt states was driving when felt diffuse burning, that has now changed to an itchy rash. Denies new soaps and detergents, but did eat a piece of breaded chicken cutlet for breakfast prior to onset of rash. Did not take Benadryl PTA. Denies fever, chills, CP SOB, vomiting, diarrhea difficulty swallowing, tongue swelling. States took 1 dose of tetracycline last night because her throat was bothering her. 55 y/o F pt presents to ED c/o itchy/burning rash to neck, chest, face and left arm x 1 hour. Pt states was driving when felt diffuse burning, that has now changed to an itchy rash. Denies new soaps and detergents, but did eat a piece of breaded chicken cutlet from the deli for breakfast prior to onset of rash. Did not take Benadryl PTA. Denies fever, chills, CP SOB, vomiting, diarrhea difficulty swallowing, tongue swelling. States took 1 dose of tetracycline last night because her throat was bothering her.

## 2020-06-19 NOTE — ED STATDOCS - PATIENT PORTAL LINK FT
You can access the FollowMyHealth Patient Portal offered by [facility] by registering at the following website: [email or web address]. By joining [employer]’s FollowMyHealth portal, you will also be able to view your health information using other applications (apps) compatible with our system.

## 2020-06-19 NOTE — ED STATDOCS - PHYSICAL EXAMINATION
Gen: Well appearing in NAD  Head: NC/AT, no pooling of secretions, no stridor  Neck: trachea midline  Resp:  CTAB, no respiratory distress  CV: RRR  Ext: no deformities  Neuro:  A&O appears non focal  Skin:  Warm and dry as visualized, + erythematous, blanching rash noted to chest, neck, face and L arm  Psych:  Normal affect and mood Gen: Well appearing in NAD  Head: NC/AT, no pooling of secretions, no stridor, no oropharyngeal edema  Neck: trachea midline  Resp:  CTAB, no respiratory distress  CV: RRR  Ext: no deformities  Neuro:  A&O appears non focal  Skin:  Warm and dry as visualized, + erythematous, blanching rash noted to chest, neck, face and L arm  Psych:  Normal affect and mood

## 2020-06-19 NOTE — ED STATDOCS - CLINICAL SUMMARY MEDICAL DECISION MAKING FREE TEXT BOX
Well appearing 53 y/o F pt presents c/o itchy/burning rash to face, chest, neck, L arm; no respiratory distress, stridor or tongue edema on exam. Likely allergic reaction, will give Benadryl, observe and likely DC home with PCP f/u. Well appearing 53 y/o F pt presents c/o itchy/burning rash to face, chest, neck, L arm; no respiratory distress, stridor or tongue edema on exam. Likely allergic reaction, will give Benadryl, observe and likely DC home with PCP/allergy f/u.

## 2020-10-07 ENCOUNTER — APPOINTMENT (OUTPATIENT)
Dept: ANTEPARTUM | Facility: CLINIC | Age: 55
End: 2020-10-07

## 2020-10-08 ENCOUNTER — APPOINTMENT (OUTPATIENT)
Dept: ANTEPARTUM | Facility: CLINIC | Age: 55
End: 2020-10-08

## 2020-10-12 ENCOUNTER — APPOINTMENT (OUTPATIENT)
Dept: ANTEPARTUM | Facility: CLINIC | Age: 55
End: 2020-10-12
Payer: MEDICAID

## 2020-10-12 ENCOUNTER — ASOB RESULT (OUTPATIENT)
Age: 55
End: 2020-10-12

## 2020-10-12 PROCEDURE — 76830 TRANSVAGINAL US NON-OB: CPT | Mod: 59

## 2020-10-12 PROCEDURE — 76856 US EXAM PELVIC COMPLETE: CPT | Mod: 59

## 2020-12-21 PROBLEM — Z12.11 ENCOUNTER FOR SCREENING COLONOSCOPY: Status: RESOLVED | Noted: 2019-11-19 | Resolved: 2020-12-21

## 2021-11-28 ENCOUNTER — EMERGENCY (EMERGENCY)
Facility: HOSPITAL | Age: 56
LOS: 1 days | Discharge: HOME | End: 2021-11-28
Payer: MEDICAID

## 2021-11-28 VITALS
OXYGEN SATURATION: 98 % | SYSTOLIC BLOOD PRESSURE: 140 MMHG | HEIGHT: 63 IN | HEART RATE: 80 BPM | RESPIRATION RATE: 18 BRPM | TEMPERATURE: 98 F | DIASTOLIC BLOOD PRESSURE: 92 MMHG

## 2021-11-28 DIAGNOSIS — Z98.890 OTHER SPECIFIED POSTPROCEDURAL STATES: Chronic | ICD-10-CM

## 2021-11-28 PROCEDURE — U0003: CPT

## 2021-11-28 PROCEDURE — 99283 EMERGENCY DEPT VISIT LOW MDM: CPT

## 2021-11-28 PROCEDURE — 99282 EMERGENCY DEPT VISIT SF MDM: CPT

## 2021-11-28 PROCEDURE — U0005: CPT

## 2021-11-28 NOTE — ED STATDOCS - OBJECTIVE STATEMENT
56 F presents for covid swab at my request. Her father is currently my patient who tested positive for covid. She has no complaints.

## 2021-11-28 NOTE — ED ADULT TRIAGE NOTE - CHIEF COMPLAINT QUOTE
Ambulatory patient visitor who's father was swabbed positive and was instructed to get a test herself. No complaints. Vaccinated.

## 2021-11-28 NOTE — ED STATDOCS - PATIENT PORTAL LINK FT
You can access the FollowMyHealth Patient Portal offered by [facility] by registering at the following website: [email or web address]. By joining [employer]’s FollowMyHealth portal, you will also be able to view your health information using other applications (apps) compatible with our system.

## 2021-11-29 LAB — SARS-COV-2 RNA SPEC QL NAA+PROBE: SIGNIFICANT CHANGE UP

## 2022-06-17 NOTE — ED ADULT TRIAGE NOTE - RESPIRATORY RATE (BREATHS/MIN)
Patient currently being seen in the ED for this complaint.      Reason for Disposition  â¢ Already left for the hospital/clinic    Protocols used: NO CONTACT OR DUPLICATE CONTACT CALL-P-AH 18
